# Patient Record
Sex: FEMALE | Race: OTHER | HISPANIC OR LATINO | URBAN - METROPOLITAN AREA
[De-identification: names, ages, dates, MRNs, and addresses within clinical notes are randomized per-mention and may not be internally consistent; named-entity substitution may affect disease eponyms.]

---

## 2020-03-15 ENCOUNTER — EMERGENCY (EMERGENCY)
Facility: HOSPITAL | Age: 25
LOS: 1 days | Discharge: HOME | End: 2020-03-15
Attending: EMERGENCY MEDICINE
Payer: COMMERCIAL

## 2020-03-15 VITALS
HEART RATE: 94 BPM | WEIGHT: 195.11 LBS | RESPIRATION RATE: 16 BRPM | SYSTOLIC BLOOD PRESSURE: 132 MMHG | HEIGHT: 68 IN | OXYGEN SATURATION: 97 % | TEMPERATURE: 98 F | DIASTOLIC BLOOD PRESSURE: 87 MMHG

## 2020-03-15 PROCEDURE — 94640 AIRWAY INHALATION TREATMENT: CPT

## 2020-03-15 PROCEDURE — 99283 EMERGENCY DEPT VISIT LOW MDM: CPT | Mod: 25

## 2020-03-15 PROCEDURE — 99283 EMERGENCY DEPT VISIT LOW MDM: CPT

## 2020-03-15 RX ORDER — IPRATROPIUM/ALBUTEROL SULFATE 18-103MCG
9 AEROSOL WITH ADAPTER (GRAM) INHALATION ONCE
Refills: 0 | Status: COMPLETED | OUTPATIENT
Start: 2020-03-15 | End: 2020-03-15

## 2020-03-15 RX ADMIN — Medication 50 MILLIGRAM(S): at 04:20

## 2020-03-15 RX ADMIN — Medication 9 MILLILITER(S): at 04:20

## 2020-03-15 NOTE — ED PROVIDER NOTE - CLINICAL SUMMARY MEDICAL DECISION MAKING FREE TEXT BOX
25 y/o F patient presents to the ED w/ asthma exacerbation. Will treat asthma exacerbation and reassess.

## 2020-03-15 NOTE — ED PROVIDER NOTE - OBJECTIVE STATEMENT
23 y/o F patient, w/ PMHx of Asthma, presents to the ED w/ asthma exacerbation today (3/15/2020). Patient endorses asthma exacerbation as her normal asthma exacerbation. Patient reports Albuterol inhaler did not help. Patient denies fever, chills, night sweats, nausea, vomiting, or any other acute complaints. Allergies: Benadryl: Unknown reaction, NyQuil Cough: Unknown reaction.

## 2020-03-15 NOTE — ED PROVIDER NOTE - PATIENT PORTAL LINK FT
[facility]  is requesting to change Rx Methocarbamol 750mg  to Methocarbamol 500mg ( due to Rx is on back order)  .Pharmacy number [phone].        Message received & copied from [facility] You can access the FollowMyHealth Patient Portal offered by [facility] by registering at the following website: [email or web address]. By joining [employer]s FollowMyHealth portal, you will also be able to view your health information using other applications (apps) compatible with our system.

## 2020-10-05 NOTE — ED PROVIDER NOTE - RESPIRATORY WHEEZES
10/05/20    Parent of  Barbie Angeles  [address] [place] [id number]       This letter is to remind you that Barbie is due for a complete check up with .  Also, there are pending lab orders that were discussed after her appointment last year.  These do include fasting labs.  Please call to arrange an appointment for both a lab visit and a complete check up with .  The orders will be extended 30 days.  If any questions, feel free to call.    Sincerely,         Office of Dr.Andrea Snowden  [facility]  [address]  [address] [place] [id number]  Phone: [phone]  Fax: [phone]              
Bilateral